# Patient Record
(demographics unavailable — no encounter records)

---

## 2024-10-11 NOTE — DISCUSSION/SUMMARY
[FreeTextEntry1] : 11 month old girl presenting with concern of eyelid swelling, with exam noted for ptosis, but sx are improving. Hx notable for allergic rxn regarding facial swelling.  - provided education regarding dx/CC to family; reviewed differential  - discussed case w/ophtho (Dr. Chester) for guidance on Teams. reviewed sx and red flags to monitor for with MOC. Contact information provided for ophtho.  - Return to office if persistent/progressive sx, or new concerns arise - Reviewed red flags that would indicate emergent evaluation

## 2024-10-11 NOTE — HISTORY OF PRESENT ILLNESS
[de-identified] : L eye swollen  [FreeTextEntry6] : Presents for concern of L eye being swollen. No fevers or sick sx, nor recently. Drinking adequately, and voiding appropriately. Has never occurred before. denies hx of developmental delays, nor ICU stay. No known trauma. Has been improving throughout the day already. Acting like herself, no abnormal behaviors noticed. Hx is notable for previous visit toward end of September that patient had an allergic reaction with facial swelling.

## 2024-10-11 NOTE — PHYSICAL EXAM
[EOMI] : grossly EOMI [NL] : regular rate and rhythm, normal S1, S2 audible, no murmurs [Discharge] : no discharge [Conjuctival Injection] : no conjunctival injection [FreeTextEntry1] : fussy with hands on care  [FreeTextEntry5] : drooping L eye lid (improving in comparison to photo presented)

## 2024-10-29 NOTE — PHYSICAL EXAM
Sent scripts. [Alert] : alert [Normocephalic] : normocephalic [Closed Anterior [place]] : closed anterior fontanelle [Red Reflex] : red reflex bilateral [PERRL] : PERRL [Normally Placed Ears] : normally placed ears [Auricles Well Formed] : auricles well formed [Clear Tympanic membranes] : clear tympanic membranes [Light reflex present] : light reflex present [Bony landmarks visible] : bony landmarks visible [Discharge] : no discharge [Nares Patent] : nares patent [Palate Intact] : palate intact [Uvula Midline] : uvula midline [Tooth Eruption] : tooth eruption [Supple, full passive range of motion] : supple, full passive range of motion [Palpable Masses] : no palpable masses [Symmetric Chest Rise] : symmetric chest rise [Clear to Auscultation Bilaterally] : clear to auscultation bilaterally [Regular Rate and Rhythm] : regular rate and rhythm [S1, S2 present] : S1, S2 present [Murmurs] : no murmurs [+2 Femoral Pulses] : (+) 2 femoral pulses [Soft] : soft [Tender] : nontender [Distended] : nondistended [Bowel Sounds] : normoactive bowel sounds [Hepatomegaly] : no hepatomegaly [Splenomegaly] : no splenomegaly [Normal External Genitalia] : normal external genitalia [Clitoromegaly] : no clitoromegaly [Normal Vaginal Introitus] : normal vaginal introitus [No Abnormal Lymph Nodes Palpated] : no abnormal lymph nodes palpated [Symmetric Abduction and Rotation of Hips] : symmetric abduction and rotation of hips [Allis Sign] : negative Allis sign [Straight] : straight [Cranial Nerves Grossly Intact] : cranial nerves grossly intact [Rash or Lesions] : no rash/lesions

## 2024-10-29 NOTE — DEVELOPMENTAL MILESTONES
[Normal Development] : Normal Development [None] : none [Looks for hidden objects] : looks for hidden objects [Imitates new gestures] : imitates new gestures [Says "Dad" or "Mom" with meaning] : says "Dad" or "Mom" with meaning [Follows a verbal command that] : follows a verbal command that includes a gesture [Takes first independent] : takes first independent steps [Stands without support] : stands without support [Drops object in a cup] : drops object in a cup [Picks up small object with 2 finger] : picks up small object with 2 finger pincer grasp [Picks up food and eats it] : picks up food and eats it [FreeTextEntry1] : PT FOR TORTICOLLIS

## 2024-10-29 NOTE — DISCUSSION/SUMMARY
[Normal Growth] : growth [Normal Development] : development [None] : No known medical problems [No Elimination Concerns] : elimination [No Feeding Concerns] : feeding [No Skin Concerns] : skin [Normal Sleep Pattern] : sleep [Family Support] : family support [Establishing Routines] : establishing routines [Feeding and Appetite Changes] : feeding and appetite changes [Establishing A Dental Home] : establishing a dental home [Safety] : safety [No Medications] : ~He/She~ is not on any medications [Parent/Guardian] : parent/guardian [FreeTextEntry3] : MOC DECLINED FLU VACCINE [] : The components of the vaccine(s) to be administered today are listed in the plan of care. The disease(s) for which the vaccine(s) are intended to prevent and the risks have been discussed with the caretaker.  The risks are also included in the appropriate vaccination information statements which have been provided to the patient's caregiver.  The caregiver has given consent to vaccinate.

## 2024-10-29 NOTE — HISTORY OF PRESENT ILLNESS
[Parents] : parents [Yes] : Patient goes to dentist yearly [Toothpaste] : Primary Fluoride Source: Toothpaste [No] : Not at  exposure [Smoke Detectors] : Smoke detectors [Carbon Monoxide Detectors] : Carbon monoxide detectors [de-identified] : SIMILAC SOY [[facility]] : [id number] [FreeTextEntry9] : HOME

## 2024-11-20 NOTE — PHYSICAL EXAM
General [FreeTextEntry1] : Well-developed, well-nourished 12-month-old male in no acute distress. He is awake and alert and appears to be resting comfortably.   The head is normocephalic, atraumatic with full range of motion of the cervical spine with no pain. Eyes are clear with no sclera abnormalities. Ears, nose and mouth are clear. The child is moving all limbs spontaneously. Full range of motion of bilateral upper extremities. The motor exam is 5/5 of bilateral shoulders, elbows, wrists, and hands. The pulses are 2+ at both wrists. The child has full range of motion of bilateral hips, knees, ankles, and feet with motor exam of 5/5 of both lower extremities. No apparent limb length discrepancy.  Sensation is grossly intact in bilateral upper and lower extremities. Pulses are 2+ at both feet. There are no palpable masses, warmth, or tenderness in bilateral upper and lower extremities. Examination of bilateral lower extremities reveals wide symmetric abduction of bilateral hips to greater than 60.  Bilateral knees with full range of motion. Both knees are clinically stable. Negative Galeazzi.  Bilateral foot with mild flexible MA L>R No evidence of club foot No cavo varus deformity

## 2024-11-20 NOTE — END OF VISIT
[FreeTextEntry3] :     Saw and examined patient; the above is an accurate documentation of my words and actions.   Georgiana Thomas MD [facility] Pediatric Orthopedic Surgery

## 2024-11-20 NOTE — PHYSICAL EXAM
[FreeTextEntry1] : Well-developed, well-nourished 12-month-old male in no acute distress. He is awake and alert and appears to be resting comfortably.   The head is normocephalic, atraumatic with full range of motion of the cervical spine with no pain. Eyes are clear with no sclera abnormalities. Ears, nose and mouth are clear. The child is moving all limbs spontaneously. Full range of motion of bilateral upper extremities. The motor exam is 5/5 of bilateral shoulders, elbows, wrists, and hands. The pulses are 2+ at both wrists. The child has full range of motion of bilateral hips, knees, ankles, and feet with motor exam of 5/5 of both lower extremities. No apparent limb length discrepancy.  Sensation is grossly intact in bilateral upper and lower extremities. Pulses are 2+ at both feet. There are no palpable masses, warmth, or tenderness in bilateral upper and lower extremities. Examination of bilateral lower extremities reveals wide symmetric abduction of bilateral hips to greater than 60.  Bilateral knees with full range of motion. Both knees are clinically stable. Negative Galeazzi.  Bilateral foot with mild flexible MA L>R No evidence of club foot No cavo varus deformity

## 2024-11-20 NOTE — END OF VISIT
[FreeTextEntry3] :     Saw and examined patient; the above is an accurate documentation of my words and actions.   Georgiana Thomas MD [facility] Pediatric Orthopedic Surgery

## 2024-11-20 NOTE — HISTORY OF PRESENT ILLNESS
[FreeTextEntry1] : Sandra is a 12 month old female with history of plagiocephaly and torticollis presents with her parents for evaluation of feet turning inwards. Mother states that the child's physical therapist noted that the patient's left foot was turning inward while trying to stand. She is a first born female child. Denies any breech presentation. Denies any family history of hip dysplasia. No other orthopedic concerns. Here for orthopedic evaluation and management.   The patient's HPI was reviewed thoroughly with patient and parent. The patient's parent has acted as an independent historian regarding the above information due to the unreliable nature of the history obtained from the patient.

## 2024-11-20 NOTE — ASSESSMENT
[FreeTextEntry1] : Sandra is a 12 month old female with hx of plagiocephaly and torticollis presents with bilateral metatarsus adductus  Today's visit included obtaining history from the parent due to the child's age, the child could not be considered a reliable historian, requiring parent to act as independent historian  Clinical findings discussed at length with parents. The natural history of above was discussed. Option of observation vs treatment with Bebax shoes provided to the family. Family opted for the Bebax shoes. She was fitted for the shoes by [employer]. Daily parental stretching of the foot at home also recommended. She will f/u in 6 months for repeat clinical evaluation and brace check. All questions answered. Family and patient verbalize understanding of the plan.   Dottie SCHULTZ PA-C have acted as scribe and documented the above for Dr. Thomas

## 2025-01-29 NOTE — DEVELOPMENTAL MILESTONES
[Normal Development] : Normal Development [None] : none [Imitates scribbling] : imitates scribbling [Drinks from cup with little] : drinks from cup with little spilling [Points to ask for something] : points to ask for something or to get help [Uses 3 words other than names] : uses 3 words other than names [Speaks in sounds that seem like] : speaks in sounds that seem like an unknown language [Follows directions that do not] : follows direction that do not include a gesture [Looks when parent says,] : looks when parent says, "Where is...?" [Squats to  objects] : squats to  objects [Crawls up a few steps] : crawls up a few steps [Begins to run] : begins to run [Makes senthil with crayon] : makes senthil with clementineyon [Drops object into and takes object] : drops object into and takes object out of container

## 2025-01-29 NOTE — DISCUSSION/SUMMARY
[Normal Growth] : growth [Normal Development] : development [No Elimination Concerns] : elimination [No Feeding Concerns] : feeding [Normal Sleep Pattern] : sleep [Communication and Social Development] : communication and social development [Sleep Routines and Issues] : sleep routines and issues [Temper Tantrums and Discipline] : temper tantrums and discipline [Healthy Teeth] : healthy teeth [Safety] : safety [No medication Changes] : No medication changes at this time [Parent/Guardian] : parent/guardian [] : The components of the vaccine(s) to be administered today are listed in the plan of care. The disease(s) for which the vaccine(s) are intended to prevent and the risks have been discussed with the caretaker.  The risks are also included in the appropriate vaccination information statements which have been provided to the patient's caregiver.  The caregiver has given consent to vaccinate.

## 2025-01-29 NOTE — PHYSICAL EXAM
[Alert] : alert [No Acute Distress] : no acute distress [Normocephalic] : normocephalic [Anterior [place] Closed] : anterior fontanelle closed [Red Reflex Bilateral] : red reflex bilateral [PERRL] : PERRL [Normally Placed Ears] : normally placed ears [Auricles Well Formed] : auricles well formed [Clear Tympanic membranes with present light reflex and bony landmarks] : clear tympanic membranes with present light reflex and bony landmarks [No Discharge] : no discharge [Nares Patent] : nares patent [Palate Intact] : palate intact [Uvula Midline] : uvula midline [Tooth Eruption] : tooth eruption  [Supple, full passive range of motion] : supple, full passive range of motion [No Palpable Masses] : no palpable masses [Symmetric Chest Rise] : symmetric chest rise [Clear to Auscultation Bilaterally] : clear to auscultation bilaterally [Regular Rate and Rhythm] : regular rate and rhythm [S1, S2 present] : S1, S2 present [No Murmurs] : no murmurs [+2 Femoral Pulses] : +2 femoral pulses [Soft] : soft [NonTender] : non tender [Non Distended] : non distended [Normoactive Bowel Sounds] : normoactive bowel sounds [No Hepatomegaly] : no hepatomegaly [No Splenomegaly] : no splenomegaly [Refugio 1] : Refugio 1 [No Clitoromegaly] : no clitoromegaly [Normal Vaginal Introitus] : normal vaginal introitus [Patent] : patent [Normally Placed] : normally placed [No Abnormal Lymph Nodes Palpated] : no abnormal lymph nodes palpated [No Clavicular Crepitus] : no clavicular crepitus [Negative Mukherjee-Ortalani] : negative Mukherjee-Ortalani [Symmetric Buttocks Creases] : symmetric buttocks creases [No Spinal Dimple] : no spinal dimple [NoTuft of Hair] : no tuft of hair [Cranial Nerves Grossly Intact] : cranial nerves grossly intact [No Rash or Lesions] : no rash or lesions

## 2025-01-31 NOTE — HISTORY OF PRESENT ILLNESS
[de-identified] : POSSIBLE ATHLETES FOOT. [FreeTextEntry6] : peeling in-between toes no recent indoor pools or waterparks seen for well 2d ago w/no sxs

## 2025-01-31 NOTE — PHYSICAL EXAM
[NL] : moves all extremities x4, warm, well perfused x4 [de-identified] : mildly pruritic interphalangeal desquamating erythema

## 2025-02-11 NOTE — HISTORY OF PRESENT ILLNESS
[de-identified] : RUNNY NOSE, CONGESTION 8 DAY HX OF NASAL CONGESTION, NOW TURNING WHITISH/GREEN, NO FEVER, DRINKIG WELL

## 2025-04-30 NOTE — DISCUSSION/SUMMARY
[Normal Growth] : growth [Normal Development] : development [None] : No known medical problems [No Elimination Concerns] : elimination [No Feeding Concerns] : feeding [No Skin Concerns] : skin [Normal Sleep Pattern] : sleep [Family Support] : family support [Child Development and Behavior] : child development and behavior [Language Promotion/Hearing] : language promotion/hearing [[place] Training Readiness] : toliet training readiness [Safety] : safety [No medication Changes] : No medication changes at this time [Mother] : mother [Father] : father [] : The components of the vaccine(s) to be administered today are listed in the plan of care. The disease(s) for which the vaccine(s) are intended to prevent and the risks have been discussed with the caretaker.  The risks are also included in the appropriate vaccination information statements which have been provided to the patient's caregiver.  The caregiver has given consent to vaccinate.

## 2025-04-30 NOTE — PHYSICAL EXAM

## 2025-04-30 NOTE — HISTORY OF PRESENT ILLNESS
[Parents] : parents [Cow's milk (Ounces per day ___)] : consumes [unfilled] oz of Cow's milk per day [Toothpaste] : Primary Fluoride Source: Toothpaste [No] : Not at  exposure [Carbon Monoxide Detectors] : Carbon monoxide detectors [Smoke Detectors] : Smoke detectors [FreeTextEntry9] : HOME

## 2025-04-30 NOTE — PHYSICAL EXAM

## 2025-05-19 NOTE — HISTORY OF PRESENT ILLNESS
[FreeTextEntry1] : Sandra is a 18 month old female with history of plagiocephaly and torticollis presents with her parents for follow up metatarsus adductus. Last seen November 2024. She was recommended Bebax shoes. She is a first born female child. Denies any breech presentation. Denies any family history of hip dysplasia. No other orthopedic concerns. Mother reports significant improvement in her foot deformity since the last visit. She wears the Bebax at night. Here for orthopedic evaluation and management.   The patient's HPI was reviewed thoroughly with patient and parent. The patient's parent has acted as an independent historian regarding the above information due to the unreliable nature of the history obtained from the patient.

## 2025-05-19 NOTE — END OF VISIT
[FreeTextEntry3] :     Saw and examined patient; the above is an accurate documentation of my words and actions.   Georgiana Thomas MD [facility] Pediatric Orthopedic Surgery

## 2025-05-19 NOTE — PHYSICAL EXAM
[FreeTextEntry1] : Well-developed, well-nourished 18-month-old male in no acute distress. He is awake and alert and appears to be resting comfortably.   The head is normocephalic, atraumatic with full range of motion of the cervical spine with no pain. Eyes are clear with no sclera abnormalities. Ears, nose and mouth are clear. The child is moving all limbs spontaneously. Full range of motion of bilateral upper extremities. The motor exam is 5/5 of bilateral shoulders, elbows, wrists, and hands. The pulses are 2+ at both wrists. The child has full range of motion of bilateral hips, knees, ankles, and feet with motor exam of 5/5 of both lower extremities. No apparent limb length discrepancy.  Sensation is grossly intact in bilateral upper and lower extremities. Pulses are 2+ at both feet. There are no palpable masses, warmth, or tenderness in bilateral upper and lower extremities. Examination of bilateral lower extremities reveals wide symmetric abduction of bilateral hips to greater than 60.  Bilateral knees with full range of motion. Both knees are clinically stable. Negative Galeazzi.  Resolved MA bilaterally  No evidence of club foot No cavo varus deformity

## 2025-05-19 NOTE — ASSESSMENT
[FreeTextEntry1] : Sandra is a 18 month old female with hx of plagiocephaly and torticollis presents with bilateral metatarsus adductus now completely resolved  Today's visit included obtaining history from the parent due to the child's age, the child could not be considered a reliable historian, requiring parent to act as independent historian  Clinical findings discussed at length with parents. The natural history of above was discussed. She is doing well. At this time, she can discontinue the Bebax shoes. Natural history of physiologic genu varum discussed at length.  Lower extremity alignment should improve as child ages. Parent should notice significant improvement in intoeing by age 3 but this will continue to improve until about age 8 years. Range of normal for lower extremity alignment has been discussed. Frequent falls at this age are common. Child does not develop balance and coordination until about age 3 years. Child may continue to participate in activities as tolerated. Return for followup on as needed basis. All questions answered. Family and patient verbalize understanding of the plan.   Dottie SCHULTZ PA-C have acted as scribe and documented the above for Dr. Thomas

## 2025-05-19 NOTE — END OF VISIT
[FreeTextEntry3] :     Saw and examined patient; the above is an accurate documentation of my words and actions.   Georgiana Thomas MD [facility] Pediatric Orthopedic Surgery